# Patient Record
Sex: MALE | Race: WHITE
[De-identification: names, ages, dates, MRNs, and addresses within clinical notes are randomized per-mention and may not be internally consistent; named-entity substitution may affect disease eponyms.]

---

## 2018-11-17 ENCOUNTER — HOSPITAL ENCOUNTER (EMERGENCY)
Dept: HOSPITAL 10 - FTE | Age: 23
Discharge: HOME | End: 2018-11-17
Payer: MEDICARE

## 2018-11-17 ENCOUNTER — HOSPITAL ENCOUNTER (EMERGENCY)
Dept: HOSPITAL 91 - FTE | Age: 23
Discharge: HOME | End: 2018-11-17
Payer: MEDICARE

## 2018-11-17 VITALS — WEIGHT: 176.37 LBS | HEIGHT: 60 IN | BODY MASS INDEX: 34.63 KG/M2

## 2018-11-17 VITALS — DIASTOLIC BLOOD PRESSURE: 64 MMHG | RESPIRATION RATE: 16 BRPM | SYSTOLIC BLOOD PRESSURE: 110 MMHG | HEART RATE: 76 BPM

## 2018-11-17 DIAGNOSIS — Z95.2: ICD-10-CM

## 2018-11-17 DIAGNOSIS — Z79.01: ICD-10-CM

## 2018-11-17 DIAGNOSIS — I10: ICD-10-CM

## 2018-11-17 DIAGNOSIS — R04.0: Primary | ICD-10-CM

## 2018-11-17 LAB
ADD MAN DIFF?: NO
ALANINE AMINOTRANSFERASE: 31 IU/L (ref 13–69)
ALBUMIN/GLOBULIN RATIO: 1.5
ALBUMIN: 4.5 G/DL (ref 3.3–4.9)
ALKALINE PHOSPHATASE: 122 IU/L (ref 42–121)
ANION GAP: 7 (ref 5–13)
ASPARTATE AMINO TRANSFERASE: 41 IU/L (ref 15–46)
BASOPHIL #: 0.1 10^3/UL (ref 0–0.1)
BASOPHILS %: 0.8 % (ref 0–2)
BILIRUBIN,DIRECT: 0 MG/DL (ref 0–0.2)
BILIRUBIN,TOTAL: 0.7 MG/DL (ref 0.2–1.3)
BLOOD UREA NITROGEN: 14 MG/DL (ref 7–20)
CALCIUM: 9 MG/DL (ref 8.4–10.2)
CARBON DIOXIDE: 31 MMOL/L (ref 21–31)
CHLORIDE: 102 MMOL/L (ref 97–110)
CREATININE: 0.83 MG/DL (ref 0.61–1.24)
EOSINOPHILS #: 0.2 10^3/UL (ref 0–0.5)
EOSINOPHILS %: 2.6 % (ref 0–7)
GLOBULIN: 3 G/DL (ref 1.3–3.2)
GLUCOSE: 115 MG/DL (ref 70–220)
HEMATOCRIT: 41.6 % (ref 42–52)
HEMOGLOBIN: 13.5 G/DL (ref 14–18)
IMMATURE GRANS #M: 0.04 10^3/UL (ref 0–0.03)
IMMATURE GRANS % (M): 0.6 % (ref 0–0.43)
INR: 3.19
LYMPHOCYTES #: 0.7 10^3/UL (ref 0.8–2.9)
LYMPHOCYTES %: 10.6 % (ref 15–51)
MEAN CORPUSCULAR HEMOGLOBIN: 27.1 PG (ref 29–33)
MEAN CORPUSCULAR HGB CONC: 32.5 G/DL (ref 32–37)
MEAN CORPUSCULAR VOLUME: 83.5 FL (ref 82–101)
MEAN PLATELET VOLUME: 11.5 FL (ref 7.4–10.4)
MONOCYTE #: 0.6 10^3/UL (ref 0.3–0.9)
MONOCYTES %: 10 % (ref 0–11)
NEUTROPHIL #: 4.7 10^3/UL (ref 1.6–7.5)
NEUTROPHILS %: 75.4 % (ref 39–77)
NUCLEATED RED BLOOD CELLS #: 0 10^3/UL (ref 0–0)
NUCLEATED RED BLOOD CELLS%: 0 /100WBC (ref 0–0)
PARTIAL THROMBOPLASTIN TIME: 56.5 SEC (ref 23–35)
PLATELET COUNT: 143 10^3/UL (ref 140–415)
POTASSIUM: 4 MMOL/L (ref 3.5–5.1)
PROTIME: 33.6 SEC (ref 11.9–14.9)
PT RATIO: 2.6
RED BLOOD COUNT: 4.98 10^6/UL (ref 4.7–6.1)
RED CELL DISTRIBUTION WIDTH: 14.8 % (ref 11.5–14.5)
SODIUM: 140 MMOL/L (ref 135–144)
TOTAL PROTEIN: 7.5 G/DL (ref 6.1–8.1)
WHITE BLOOD COUNT: 6.2 10^3/UL (ref 4.8–10.8)

## 2018-11-17 PROCEDURE — 85610 PROTHROMBIN TIME: CPT

## 2018-11-17 PROCEDURE — 85730 THROMBOPLASTIN TIME PARTIAL: CPT

## 2018-11-17 PROCEDURE — 99283 EMERGENCY DEPT VISIT LOW MDM: CPT

## 2018-11-17 PROCEDURE — 80053 COMPREHEN METABOLIC PANEL: CPT

## 2018-11-17 PROCEDURE — 85025 COMPLETE CBC W/AUTO DIFF WBC: CPT

## 2018-11-17 NOTE — ERD
ER Documentation


Chief Complaint


Chief Complaint





epistaxis





HPI


Patient is a 23-year-old male with a past medical history of hypertension, spina


bifida, schizophrenia, bipolar disorder, hypertension, status post heart valve 


replacement in May 2017, currently on warfarin anticoagulation therapy, who 


presents the ER for concerns of epistaxis.  Patient states his nose started 


bleeding approximately 1 hour ago.  Patient states he has been applying 


pressure.  Patient did have a nose clamp placed in triage which has now 


intermittently stopped the bleeding.  Patient denies any lightheadedness or 


dizziness.  Patient denies any trauma or falls.  Patient denies any fevers, 


chills, chest pain, shortness of breath or LOC.  Patient states that he has not 


had his INR level checked in over 1 month given that he was not allowed to leave


work.  Patient states that he takes 5 mg of Coumadin during the weekdays and 4 


mg on the weekends.  Patient also reports taking aspirin 81 mg daily.  Patient 


is here with his brother.





ROS


All systems reviewed and are negative except as per history of present illness.





Allergies


Allergies:  


Coded Allergies:  


     No Known Drug Allergies (Verified  Allergy, Unknown, 11/17/18)





PMhx/Soc


History of Surgery:  Yes (valve replacement)


Hx Cardiac Disorders:  Yes (heart surgery)


Hx Alcohol Use:  No


Hx Substance Use:  No


Hx Tobacco Use:  No


Smoking Status:  Never smoker





FmHx


Family History:  No diabetes





Physical Exam


Vitals





Physical Exam


GENERAL: Well-developed, well-nourished male. Appears in no acute distress.  


Speaking in full sentences


HEAD: Normocephalic, atraumatic. 


EYES: Pupils are equally reactive bilaterally. EOMs grossly intact. No 


conjunctival erythema. 


ENT: Moist mucous membranes. No uvula deviation. No kissing tonsils.  Dried 


blood noted in the right nare.  No active bleeding at this time.  No blood in 


the posterior oropharynx.


NECK: Supple. No meningismus. Normal range of motion of the neck.


LUNG: Clear to auscultation bilaterally. No rhonchi, wheezing, rales or coarse 


breath sounds. 


HEART: Regular rate and rhythm with a valvular click. No murmurs, rubs or 


gallops.


EXTREMITIES: Equal pulses bilaterally. No peripheral clubbing, cyanosis or 


edema. No unilateral leg swelling.


NEUROLOGIC: Alert and oriented. Moving all four extremities without any 


difficulty. Normal speech. Steady gait. 


SKIN: Normal color. Warm and dry. No rashes or lesions.


Results 24 hrs





Laboratory Tests


              Test
                                11/17/18
19:05


              White Blood Count                      6.2 10^3/ul


              Red Blood Count                       4.98 10^6/ul


              Hemoglobin                               13.5 g/dl


              Hematocrit                                  41.6 %


              Mean Corpuscular Volume                    83.5 fl


              Mean Corpuscular Hemoglobin                27.1 pg


              Mean Corpuscular Hemoglobin
Concent     32.5 g/dl 



              Red Cell Distribution Width                 14.8 %


              Platelet Count                         143 10^3/UL


              Mean Platelet Volume                       11.5 fl


              Immature Granulocytes %                    0.600 %


              Neutrophils %                               75.4 %


              Lymphocytes %                               10.6 %


              Monocytes %                                 10.0 %


              Eosinophils %                                2.6 %


              Basophils %                                  0.8 %


              Nucleated Red Blood Cells %            0.0 /100WBC


              Immature Granulocytes #              0.040 10^3/ul


              Neutrophils #                          4.7 10^3/ul


              Lymphocytes #                          0.7 10^3/ul


              Monocytes #                            0.6 10^3/ul


              Eosinophils #                          0.2 10^3/ul


              Basophils #                            0.1 10^3/ul


              Nucleated Red Blood Cells #            0.0 10^3/ul


              Prothrombin Time                          33.6 Sec


              Prothrombin Time Ratio                         2.6


              INR International Normalized
Ratio           3.19 



              Activated Partial
Thromboplast Time      56.5 Sec 



              Sodium Level                            140 mmol/L


              Potassium Level                         4.0 mmol/L


              Chloride Level                          102 mmol/L


              Carbon Dioxide Level                     31 mmol/L


              Anion Gap                                        7


              Blood Urea Nitrogen                       14 mg/dl


              Creatinine                              0.83 mg/dl


              Est Glomerular Filtrat Rate
mL/min   > 60 mL/min 



              Glucose Level                            115 mg/dl


              Calcium Level                            9.0 mg/dl


              Total Bilirubin                          0.7 mg/dl


              Direct Bilirubin                        0.00 mg/dl


              Indirect Bilirubin                       0.7 mg/dl


              Aspartate Amino Transf
(AST/SGOT)         41 IU/L 



              Alanine Aminotransferase
(ALT/SGPT)       31 IU/L 



              Alkaline Phosphatase                      122 IU/L


              Total Protein                             7.5 g/dl


              Albumin                                   4.5 g/dl


              Globulin                                 3.00 g/dl


              Albumin/Globulin Ratio                        1.50








Procedures/MDM


MEDICAL DECISION MAKING:


Patient is a 23-year-old male with a history of hypertension, spina bifida, 


schizophrenia, bipolar disorder, hypertension, status post heart valve 


replacement 2017, currently on warfarin anticoagulation therapy, who presents 


the ER for concerns of epistaxis which started 1 hour prior to arrival.  Patient


stated he has not had his INR checked in over 1 month.. Vital signs were 


reviewed. Patient is afebrile. Patient was not hypoxic. Patient was 


hemodynamically stable.  At time of my examination, patient bleeding had stopped


after using a nose clamp which was provided to him in triage.





Blood work was obtained. CBC showed no evidence of systemic infection or severe 


anemia. CMP showed no evidence of electrolyte abnormalities, severe acidosis, 


alkalosis, renal failure, or liver disease. Patient's INR was 3.19.I discussed 


the patient's INR with my supervising physician Dr. Watson.  He did not feel 


that any changes to the patient's current medication regimen was indicated.  


Patient was advised to follow-up with his doctor for further management of his 


INR levels.  Patient advised to return the ER immediately for any new or 


worsening signs or symptoms including recurrent bleeding.  At this time, 


patient's presentation is most consistent with epistaxis secondary to warfarin 


use.  Low suspicion for intracranial hemorrhage, septal hematoma, posterior 


epistaxis, ACS.  Patient was nontoxic,  nonappearing prior to discharge.








DISCHARGE:


At this time, patient is stable for discharge and outpatient management. I have 


instructed the patient to follow-up with his/her primary care physician in 1-2 


days. I have discussed with the patient the possibility of needing to see a 


specialist for further workup and imaging studies if symptoms persist. I have 


instructed the patient to promptly return to the ER for any new or worsening s


ymptoms including increased pain, fever, nausea, vomiting, weakness or LOC. The 


patient and/or family expressed understanding of and agreement with this plan. 


All questions were answered. Home care instructions were provided. 





Disclaimer: Inadvertent spelling and grammatical errors are likely due to 


EHR/dictation software use and do not reflect on the overall quality of patient 


care. Also, please note that the electronic time recorded on this note does not 


necessarily reflect the actual time of the patient encounter.





Departure


Diagnosis:  


   Primary Impression:  


   Epistaxis


   Additional Impressions:  


   Warfarin anticoagulation


   History of heart valve replacement


Patient Instructions:  Epistaxis (Adult)





Additional Instructions:  


Follow up with Dr. Neely in the next 1-2 days for further management of your 


Coumadin levels. Continue all medications as prescribed.





 Monitor symptoms closely.Return to the ER for any new or worsening symptoms 


immediately.  Return to the ER if you develop a nosebleed.











MEL PEMBERTON PA-C             Nov 17, 2018 18:58

## 2019-07-06 ENCOUNTER — HOSPITAL ENCOUNTER (EMERGENCY)
Dept: HOSPITAL 10 - E/R | Age: 24
Discharge: HOME | End: 2019-07-06
Payer: MEDICARE

## 2019-07-06 ENCOUNTER — HOSPITAL ENCOUNTER (EMERGENCY)
Dept: HOSPITAL 91 - E/R | Age: 24
Discharge: HOME | End: 2019-07-06
Payer: MEDICARE

## 2019-07-06 VITALS
WEIGHT: 156.75 LBS | HEIGHT: 64 IN | HEIGHT: 64 IN | BODY MASS INDEX: 26.76 KG/M2 | BODY MASS INDEX: 26.76 KG/M2 | WEIGHT: 156.75 LBS

## 2019-07-06 DIAGNOSIS — S00.511A: Primary | ICD-10-CM
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
## 2019-07-06 DIAGNOSIS — Z79.01: ICD-10-CM

## 2019-07-06 LAB
INR: 2.37
PROTIME: 26 SEC (ref 11.9–14.9)
PT RATIO: 2

## 2019-07-06 PROCEDURE — 85610 PROTHROMBIN TIME: CPT

## 2019-07-06 PROCEDURE — 99283 EMERGENCY DEPT VISIT LOW MDM: CPT

## 2019-07-06 NOTE — ERD
ER Documentation


Chief Complaint


Chief Complaint





MOUTH BLEEDING, PT ON WARFARIN





HPI


23-year-old male was eating chicken tenders today when he  started bleeding from


his mouth.  He was unable to tell where he was bleeding from.  Currently the 


bleeding has stopped.   he is on Coumadin for an artificial mechanical heart 


valve.  No cough or difficulty breathing.  No hemoptysis.





ROS


All systems reviewed and are negative except as per history of present illness.





Allergies


Allergies:  


Coded Allergies:  


     No Known Drug Allergies (Verified  Allergy, Unknown, 11/17/18)





PMhx/Soc


History of Surgery:  Yes (valve replacement)


Hx Cardiac Disorders:  Yes (Valve replacement)


Hx Alcohol Use:  No


Hx Substance Use:  No


Hx Tobacco Use:  No


Smoking Status:  Never smoker





FmHx


Family History:  No diabetes





Physical Exam


Vitals





Vital Signs


  Date      Temp  Pulse  Resp  B/P (MAP)   Pulse Ox  O2          O2 Flow    FiO2


Time                                                 Delivery    Rate


    7/6/19  98.5    103    18      157/75        98


     20:01                          (102)





Physical Exam


INITIAL VITAL SIGNS: Reviewed by me


GENERAL: Well appearing, non toxic, speaking in full sentences.


HEENT: Atraumatic, Moist mucous membranes.  Small superficial lip abrasion on 


the upper lateral lip.


NECK: Supple.


RESPIRATORY: No respiratory distress.  Clear to auscultation bilaterally.


EXTREMITIES: No clubbing or cyanosis. No edema


SKIN: Warm, dry.


NEUROLOGIC: Awake and alert.  No facial asymmetry. Normal speech.


Results 24 hrs





Laboratory Tests


                Test
                               7/6/19
21:02


                Prothrombin Time                       26.0 Sec


                Prothrombin Time Ratio                      2.0


                INR International Normalized
Ratio        2.37 









Procedures/MDM


Patient is presenting with bleeding that he was unable to control at home from 


his lip.  Currently he is not bleeding anymore.  Given he is on warfarin, INR 


was checked and was in therapeutic range.  Patient was provided with a copy of 


his results.  It seems the bleeding was from a lip abrasion but has now stopped.


 Patient is stable for discharge with continued outpatient follow-up.  Return 


precautions discussed.  First-aid for bleeding discussed.





Departure


Diagnosis:  


   Primary Impression:  


   Lip abrasion


   Encounter type:  initial encounter  Qualified Codes:  S00.511A - Abrasion of 


   lip, initial encounter


   Additional Impression:  


   On warfarin at home


Condition:  Stable


Patient Instructions:  Araceli, SHAY Larios MD          Jul 6, 2019 21:39

## 2019-09-28 ENCOUNTER — HOSPITAL ENCOUNTER (EMERGENCY)
Dept: HOSPITAL 10 - E/R | Age: 24
Discharge: HOME | End: 2019-09-28
Payer: MEDICARE

## 2019-09-28 ENCOUNTER — HOSPITAL ENCOUNTER (EMERGENCY)
Dept: HOSPITAL 91 - E/R | Age: 24
Discharge: HOME | End: 2019-09-28
Payer: MEDICARE

## 2019-09-28 VITALS
WEIGHT: 153.66 LBS | HEIGHT: 63 IN | BODY MASS INDEX: 27.23 KG/M2 | BODY MASS INDEX: 27.23 KG/M2 | WEIGHT: 153.66 LBS | HEIGHT: 63 IN

## 2019-09-28 VITALS — DIASTOLIC BLOOD PRESSURE: 62 MMHG | HEART RATE: 58 BPM | RESPIRATION RATE: 18 BRPM | SYSTOLIC BLOOD PRESSURE: 109 MMHG

## 2019-09-28 DIAGNOSIS — I50.9: ICD-10-CM

## 2019-09-28 DIAGNOSIS — R07.9: Primary | ICD-10-CM

## 2019-09-28 DIAGNOSIS — Z79.01: ICD-10-CM

## 2019-09-28 PROCEDURE — 82553 CREATINE MB FRACTION: CPT

## 2019-09-28 PROCEDURE — 85025 COMPLETE CBC W/AUTO DIFF WBC: CPT

## 2019-09-28 PROCEDURE — 82550 ASSAY OF CK (CPK): CPT

## 2019-09-28 PROCEDURE — 83880 ASSAY OF NATRIURETIC PEPTIDE: CPT

## 2019-09-28 PROCEDURE — 99285 EMERGENCY DEPT VISIT HI MDM: CPT

## 2019-09-28 PROCEDURE — 36415 COLL VENOUS BLD VENIPUNCTURE: CPT

## 2019-09-28 PROCEDURE — 84484 ASSAY OF TROPONIN QUANT: CPT

## 2019-09-28 PROCEDURE — 80053 COMPREHEN METABOLIC PANEL: CPT

## 2019-09-28 PROCEDURE — 71045 X-RAY EXAM CHEST 1 VIEW: CPT

## 2019-09-28 PROCEDURE — 93005 ELECTROCARDIOGRAM TRACING: CPT
